# Patient Record
Sex: FEMALE | Race: BLACK OR AFRICAN AMERICAN | Employment: PART TIME | URBAN - METROPOLITAN AREA
[De-identification: names, ages, dates, MRNs, and addresses within clinical notes are randomized per-mention and may not be internally consistent; named-entity substitution may affect disease eponyms.]

---

## 2018-05-03 LAB
ANTIBODY SCREEN, EXTERNAL: NEGATIVE
HBSAG, EXTERNAL: NEGATIVE
HIV, EXTERNAL: NEGATIVE
PLATELET CNT,   EXTERNAL: NORMAL
RPR, EXTERNAL: NORMAL
RUBELLA, EXTERNAL: NORMAL
TYPE, ABO & RH, EXTERNAL: NORMAL

## 2018-08-10 LAB — GRBS, EXTERNAL: POSITIVE

## 2018-09-04 ENCOUNTER — HOSPITAL ENCOUNTER (INPATIENT)
Age: 33
LOS: 2 days | Discharge: HOME | DRG: 560 | End: 2018-09-06
Attending: OBSTETRICS & GYNECOLOGY | Admitting: OBSTETRICS & GYNECOLOGY
Payer: MEDICAID

## 2018-09-04 PROBLEM — Z33.1 IUP (INTRAUTERINE PREGNANCY), INCIDENTAL: Status: ACTIVE | Noted: 2018-09-04

## 2018-09-04 LAB
ABO + RH BLD: NORMAL
BASOPHILS # BLD: 0 K/UL (ref 0–0.1)
BASOPHILS NFR BLD: 0 % (ref 0–2)
BLOOD GROUP ANTIBODIES SERPL: NORMAL
DIFFERENTIAL METHOD BLD: ABNORMAL
EOSINOPHIL # BLD: 0.1 K/UL (ref 0–0.4)
EOSINOPHIL NFR BLD: 1 % (ref 0–5)
ERYTHROCYTE [DISTWIDTH] IN BLOOD BY AUTOMATED COUNT: 14.3 % (ref 11.6–14.5)
HCT VFR BLD AUTO: 36 % (ref 35–45)
HGB BLD-MCNC: 12.8 G/DL (ref 12–16)
LYMPHOCYTES # BLD: 1.9 K/UL (ref 0.9–3.6)
LYMPHOCYTES NFR BLD: 16 % (ref 21–52)
MCH RBC QN AUTO: 33.6 PG (ref 24–34)
MCHC RBC AUTO-ENTMCNC: 35.6 G/DL (ref 31–37)
MCV RBC AUTO: 94.5 FL (ref 74–97)
MONOCYTES # BLD: 0.7 K/UL (ref 0.05–1.2)
MONOCYTES NFR BLD: 6 % (ref 3–10)
NEUTS SEG # BLD: 8.6 K/UL (ref 1.8–8)
NEUTS SEG NFR BLD: 77 % (ref 40–73)
PLATELET # BLD AUTO: 248 K/UL (ref 135–420)
PMV BLD AUTO: 11.1 FL (ref 9.2–11.8)
RBC # BLD AUTO: 3.81 M/UL (ref 4.2–5.3)
SPECIMEN EXP DATE BLD: NORMAL
WBC # BLD AUTO: 11.3 K/UL (ref 4.6–13.2)

## 2018-09-04 PROCEDURE — 65270000029 HC RM PRIVATE

## 2018-09-04 PROCEDURE — 74011250636 HC RX REV CODE- 250/636: Performed by: OBSTETRICS & GYNECOLOGY

## 2018-09-04 PROCEDURE — 0HQ9XZZ REPAIR PERINEUM SKIN, EXTERNAL APPROACH: ICD-10-PCS | Performed by: OBSTETRICS & GYNECOLOGY

## 2018-09-04 PROCEDURE — 85025 COMPLETE CBC W/AUTO DIFF WBC: CPT | Performed by: OBSTETRICS & GYNECOLOGY

## 2018-09-04 PROCEDURE — 74011250637 HC RX REV CODE- 250/637: Performed by: OBSTETRICS & GYNECOLOGY

## 2018-09-04 PROCEDURE — 4A1H74Z MONITORING OF PRODUCTS OF CONCEPTION, CARDIAC ELECTRICAL ACTIVITY, VIA NATURAL OR ARTIFICIAL OPENING: ICD-10-PCS | Performed by: OBSTETRICS & GYNECOLOGY

## 2018-09-04 PROCEDURE — 75410000000 HC DELIVERY VAGINAL/SINGLE

## 2018-09-04 PROCEDURE — 75410000002 HC LABOR FEE PER 1 HR

## 2018-09-04 PROCEDURE — 86900 BLOOD TYPING SEROLOGIC ABO: CPT | Performed by: OBSTETRICS & GYNECOLOGY

## 2018-09-04 PROCEDURE — 74011000258 HC RX REV CODE- 258: Performed by: OBSTETRICS & GYNECOLOGY

## 2018-09-04 PROCEDURE — 75410000003 HC RECOV DEL/VAG/CSECN EA 0.5 HR

## 2018-09-04 RX ORDER — METHYLERGONOVINE MALEATE 0.2 MG/ML
0.2 INJECTION INTRAVENOUS AS NEEDED
Status: DISCONTINUED | OUTPATIENT
Start: 2018-09-04 | End: 2018-09-04 | Stop reason: HOSPADM

## 2018-09-04 RX ORDER — SODIUM CHLORIDE, SODIUM LACTATE, POTASSIUM CHLORIDE, CALCIUM CHLORIDE 600; 310; 30; 20 MG/100ML; MG/100ML; MG/100ML; MG/100ML
125 INJECTION, SOLUTION INTRAVENOUS CONTINUOUS
Status: DISCONTINUED | OUTPATIENT
Start: 2018-09-04 | End: 2018-09-04 | Stop reason: HOSPADM

## 2018-09-04 RX ORDER — MINERAL OIL
30 OIL (ML) ORAL AS NEEDED
Status: DISCONTINUED | OUTPATIENT
Start: 2018-09-04 | End: 2018-09-04 | Stop reason: HOSPADM

## 2018-09-04 RX ORDER — ZOLPIDEM TARTRATE 5 MG/1
5 TABLET ORAL
Status: DISCONTINUED | OUTPATIENT
Start: 2018-09-04 | End: 2018-09-06 | Stop reason: HOSPADM

## 2018-09-04 RX ORDER — AMOXICILLIN 250 MG
1 CAPSULE ORAL
Status: DISCONTINUED | OUTPATIENT
Start: 2018-09-04 | End: 2018-09-06 | Stop reason: HOSPADM

## 2018-09-04 RX ORDER — OXYTOCIN/RINGER'S LACTATE 20/1000 ML
500 PLASTIC BAG, INJECTION (ML) INTRAVENOUS ONCE
Status: DISCONTINUED | OUTPATIENT
Start: 2018-09-04 | End: 2018-09-04 | Stop reason: HOSPADM

## 2018-09-04 RX ORDER — IBUPROFEN 400 MG/1
800 TABLET ORAL
Status: DISCONTINUED | OUTPATIENT
Start: 2018-09-04 | End: 2018-09-06 | Stop reason: HOSPADM

## 2018-09-04 RX ORDER — ACETAMINOPHEN 325 MG/1
650 TABLET ORAL
Status: DISCONTINUED | OUTPATIENT
Start: 2018-09-04 | End: 2018-09-06 | Stop reason: HOSPADM

## 2018-09-04 RX ORDER — OXYCODONE AND ACETAMINOPHEN 5; 325 MG/1; MG/1
2 TABLET ORAL
Status: DISCONTINUED | OUTPATIENT
Start: 2018-09-04 | End: 2018-09-06 | Stop reason: HOSPADM

## 2018-09-04 RX ORDER — LIDOCAINE HYDROCHLORIDE 10 MG/ML
20 INJECTION, SOLUTION EPIDURAL; INFILTRATION; INTRACAUDAL; PERINEURAL AS NEEDED
Status: DISCONTINUED | OUTPATIENT
Start: 2018-09-04 | End: 2018-09-04 | Stop reason: HOSPADM

## 2018-09-04 RX ORDER — NALBUPHINE HYDROCHLORIDE 10 MG/ML
10 INJECTION, SOLUTION INTRAMUSCULAR; INTRAVENOUS; SUBCUTANEOUS
Status: DISCONTINUED | OUTPATIENT
Start: 2018-09-04 | End: 2018-09-04 | Stop reason: HOSPADM

## 2018-09-04 RX ORDER — OXYTOCIN/RINGER'S LACTATE 20/1000 ML
125 PLASTIC BAG, INJECTION (ML) INTRAVENOUS CONTINUOUS
Status: DISCONTINUED | OUTPATIENT
Start: 2018-09-04 | End: 2018-09-04 | Stop reason: HOSPADM

## 2018-09-04 RX ORDER — LIDOCAINE HYDROCHLORIDE 10 MG/ML
INJECTION INFILTRATION; PERINEURAL
Status: DISPENSED
Start: 2018-09-04 | End: 2018-09-05

## 2018-09-04 RX ORDER — TERBUTALINE SULFATE 1 MG/ML
0.25 INJECTION SUBCUTANEOUS
Status: DISCONTINUED | OUTPATIENT
Start: 2018-09-04 | End: 2018-09-04 | Stop reason: HOSPADM

## 2018-09-04 RX ORDER — PROMETHAZINE HYDROCHLORIDE 25 MG/ML
25 INJECTION, SOLUTION INTRAMUSCULAR; INTRAVENOUS
Status: DISCONTINUED | OUTPATIENT
Start: 2018-09-04 | End: 2018-09-06 | Stop reason: HOSPADM

## 2018-09-04 RX ORDER — BUTORPHANOL TARTRATE 1 MG/ML
2 INJECTION INTRAMUSCULAR; INTRAVENOUS
Status: DISCONTINUED | OUTPATIENT
Start: 2018-09-04 | End: 2018-09-04 | Stop reason: HOSPADM

## 2018-09-04 RX ORDER — HYDROMORPHONE HYDROCHLORIDE 2 MG/ML
1 INJECTION, SOLUTION INTRAMUSCULAR; INTRAVENOUS; SUBCUTANEOUS
Status: DISCONTINUED | OUTPATIENT
Start: 2018-09-04 | End: 2018-09-04 | Stop reason: HOSPADM

## 2018-09-04 RX ADMIN — Medication 125 ML/HR: at 12:29

## 2018-09-04 RX ADMIN — BUTORPHANOL TARTRATE 2 MG: 1 INJECTION, SOLUTION INTRAMUSCULAR; INTRAVENOUS at 08:23

## 2018-09-04 RX ADMIN — OXYCODONE HYDROCHLORIDE AND ACETAMINOPHEN 1 TABLET: 5; 325 TABLET ORAL at 20:54

## 2018-09-04 RX ADMIN — IBUPROFEN 800 MG: 400 TABLET ORAL at 16:01

## 2018-09-04 RX ADMIN — SODIUM CHLORIDE 5 MILLION UNITS: 900 INJECTION INTRAVENOUS at 08:30

## 2018-09-04 NOTE — PROGRESS NOTES
TRANSFER - IN REPORT: 
 
Verbal report received from Radha Mendieta RN (name) on Nehemiah Hallmark  being received from L and D (unit) for routine progression of care Report consisted of patients Situation, Background, Assessment and  
Recommendations(SBAR). Information from the following report(s) SBAR, Intake/Output, MAR and Recent Results was reviewed with the receiving nurse. Opportunity for questions and clarification was provided. Assessment completed upon patients arrival to unit and care assumed. VSS. Pt voided x 3 in L and D. Reviewed don-care, ice, tucks and dermaplast given. Oriented to room and unit. Denies needs. 1635-asleep in bed. 1800-asleep in bed. Advised needs to attempt to feed infant and order dinner. Denies needs. 1925-Bedside and Verbal shift change report given to DAMON Moroe RN  (oncoming nurse) by NEREYDA Alvarado LPN (offgoing nurse). Report given with SBAR, Kardex, Intake/Output, MAR and Recent Results.

## 2018-09-04 NOTE — LACTATION NOTE
Attempted at 1350 for about 35 minutes--infant able to latch off and on. Mom very tired and asking to take a break. Encouraged skin to skin and attempt again in 30 minutes. Mom educated on breastfeeding basics--hunger cues, feeding on demand, waking baby if baby sleeps too long between feeds, importance of skin to skin, positioning and latching, risk of pacifier use and supplemental feedings, and importance of rooming in--and use of log sheet. Mom also educated on benefits of breastfeeding for herself and baby. Mom verbalized understanding. No questions at this time.

## 2018-09-04 NOTE — IP AVS SNAPSHOT
Summary of Care Report The Summary of Care report has been created to help improve care coordination. Users with access to [employer] or [address] (Web-based application) may access additional patient information including the Discharge Summary. If you are not currently a [address] user and need more information, please call the number listed below in the [place] [address] section and ask to be connected with Medical Records. Facility Information Name Address Phone [address] [address] [phone] Patient Information Patient Name Sex DOB Charmain Lanes ([id number]) Female 1985 Discharge Information Admitting Provider Service Area Unit Mita Castillo MD / [address] [place] [address] / [phone] Discharge Provider Discharge Date/Time Discharge Disposition Destination (none) 2018 (Pending) AHR (none) Patient Language Language ENGLISH [13] Hospital Problems as of 2018  Never Reviewed Class Noted - Resolved Last Modified POA Active Problems IUP (intrauterine pregnancy), incidental  2018 - Present 2018 by Mita Castillo MD Unknown Entered by Mita Castillo MD  
  
You are allergic to the following No active allergies Current Discharge Medication List  
  
CONTINUE these medications which have NOT CHANGED Dose & Instructions Dispensing Information Comments PNV66-Iron Fumarate-FA-DSS-DHA 26-1.2- mg Cap Dose:  1 Tab Take 1 Tab by mouth daily. Refills:  0 Follow-up Information Follow up With Details Comments Contact Info None   None (395) Patient stated that they have no PCP Discharge Instructions POST DELIVERY DISCHARGE INSTRUCTIONS Name: Jim Claudio YOB: 1985 Primary Diagnosis: Active Problems: 
  IUP (intrauterine pregnancy), incidental (2018) General:  
 
Diet/Diet Restrictions: 
Eight 8-ounce glasses of fluid daily (water, juices); avoid excessive caffeine intake. Meals/snacks as desired which are high in fiber and carbohydrates and low in fat and cholesterol. Physical Activity / Restrictions / Safety:  
 
Avoid heavy lifting, no more that 8 lbs. For 2-3 weeks; limit use of stairs to 2 times daily for the first week home. No driving for one week. Avoid intercourse 4-6 weeks, no douching or tampon use. Check with obstetrician before starting or resuming an exercise program.    
 
 
Discharge Instructions/Special Treatment/Home Care Needs:  
 
Continue prenatal vitamins. Continue to use squirt bottle with warm water on your episiotomy after each bathroom use until bleeding stops. If steri-strips applied to your incision, remove in 7-10 days. Call your doctor for the following:  
 
Fever over 101 degrees by mouth. Vaginal bleeding heavier than a normal menstrual period or clot larger than a golf ball. Red streaks or increased swelling of legs, painful red streaks on your breast. 
Painful urination, constipation and increased pain or swelling or discharge with your incision. If you feel extremely anxious or overwhelmed. If you have thoughts of harming yourself and/or your baby. Pain Management:  
 
Pain Management:  
Take Acetaminophen (Tylenol) or Ibuprofen (Advil, Motrin), as directed for pain. Use a warm Sitz bath 3 times daily to relieve episiotomy or hemorrhoidal discomfort. Heating pad to  incision as needed. For hemorrhoidal discomfort, use Tucks and Anusol cream as needed and directed. Follow-Up Care: These are general instructions for a healthy lifestyle: No smoking/ No tobacco products/ Avoid exposure to second hand smoke Surgeon General's Warning:  Quitting smoking now greatly reduces serious risk to your health. Obesity, smoking, and sedentary lifestyle greatly increases your risk for illness A healthy diet, regular physical exercise & weight monitoring are important for maintaining a healthy lifestyle Recognize signs and symptoms of STROKE: 
 
F-face looks uneven A-arms unable to move or move unevenly S-speech slurred or non-existent T-time-call 911 as soon as signs and symptoms begin-DO NOT go Back to bed or wait to see if you get better-TIME IS BRAIN. Signed By: Jeremy Sesay                                                                                                   Date: 9/6/2018 Time: 11:41 AM 
 
Patient armband removed and given to patient to take home. Patient was informed of the privacy risks if armband lost or stolen Chart Review Routing History No Routing History on File

## 2018-09-04 NOTE — PROGRESS NOTES
0803  at 39 weeks ambultes to unit stating spontaneous rupture of membranes, pt states clear moderate amount of fluid at approx 0600; taken to tr1 
 
0658 Nitrazine postive, leaking clear fluid. Taken to Room 4 
 
0705 Bedside and Verbal shift change report given to SAUMYA Muñoz RN (oncoming nurse) by Missy Davalos RN (offgoing nurse). Report included the following information SBAR, Intake/Output, MAR and Recent Results.

## 2018-09-04 NOTE — PROGRESS NOTES
1427 Bedside and Verbal shift change report given to Celestino Osorio RN 
 (oncoming nurse) by Joya Mathis RN (offgoing nurse). Report included the following information SBAR, Kardex, Intake/Output, MAR, Recent Results and Med Rec Status. 7823 SVE 5/80/-1 by this nurse  
 
7859 paged Dr. Josep Layne 
 
1493 Return call from Dr. Josep Layne, informed of pt's status, SROM, cervical exam and + GBS status. Orders rec'd to admit patient for labor. 0820 Pt requested PRN Stadol, SVE unchanged. 9106 PRN Stadol given 1226 Spontaneous delivery of liveborn male infant. 1230 Spontaneous delivery of placenta. 1345 Pt up to BR with minimal assist. Voiding without difficulty. 1555 TRANSFER - OUT REPORT: 
 
Verbal report given to [facility] LPN(name) on "[employer] Michael  being transferred to post partum(unit) for routine progression of care Report consisted of patients Situation, Background, Assessment and  
Recommendations(SBAR). Information from the following report(s) SBAR, Kardex, Procedure Summary, Intake/Output, MAR, Recent Results and Med Rec Status was reviewed with the receiving nurse. Lines:  
Peripheral IV 09/04/18 Left Forearm (Active) Site Assessment Clean, dry, & intact 9/4/2018  3:55 PM  
Phlebitis Assessment 0 9/4/2018  3:55 PM  
Infiltration Assessment 0 9/4/2018  3:55 PM  
Dressing Status Clean, dry, & intact 9/4/2018  3:55 PM  
Dressing Type Transparent;Tape 9/4/2018  3:55 PM  
Hub Color/Line Status Pink;Capped 9/4/2018  3:55 PM  
Alcohol Cap Used Yes 9/4/2018  8:00 AM  
  
 
Opportunity for questions and clarification was provided. Patient transported with: 
 Registered Nurse

## 2018-09-04 NOTE — H&P
History & Physical 
 
Name: Luz Marina Verma MRN: [id number]  SSN: [id number] YOB: 1985  Age: 34 y.o. Sex: female Subjective:  
 
Estimated Date of Delivery: 18 OB History  Para Term  AB Living  
 1 SAB TAB Ectopic Molar Multiple Live Births Ms. Sacha Espitia is admitted with pregnancy at 39w4d for active labor. Prenatal course was normal.Prenatal care has been [facility] by [facility]. Please see prenatal records for details. No past medical history on file. No past surgical history on file. Social History Occupational History  Not on file. Social History Main Topics  Smoking status: Not on file  Smokeless tobacco: Not on file  Alcohol use Not on file  Drug use: Not on file  Sexual activity: Not on file No family history on file. No Known Allergies Prior to Admission medications Medication Sig Start Date End Date Taking? Authorizing Provider PNV66-Iron Fumarate-FA-DSS-DHA 26-1.2- mg cap Take 1 Tab by mouth daily. Yes Historical Provider Review of Systems: A comprehensive review of systems was negative except for that written in the HPI. Objective:  
 
Vitals: 
Vitals:  
 18 6490 BP: 119/69 Pulse: 69 Physical Exam: 
Cervical Exam: 500/-2 [place]: q 3 min FHTs: category 1 Prenatal Labs:  
Lab Results Component Value Date/Time  
 Rubella, External immune 2018 GrBStrep, External positive 08/10/2018 HBsAg, External negative 2018 HIV, External negative 2018 RPR, External non-reactive 2018 Assessment/Plan:  
 
Plan: Admit for labor with reassuring fetal status. Group B Strep was positive in urine at the beginning of pregnnacy, will treat prophylactically with PCN. Signed By:  Kearney Bosworth, MD   
 2018

## 2018-09-04 NOTE — IP AVS SNAPSHOT
[address] [place] [address] 
[phone] Patient: Ari Ambrocio MRN: [id number] :1985 About your hospitalization You were admitted on:  2018 You last received care in the:  [address] [place] [address] You were discharged on:  2018 Why you were hospitalized Your primary diagnosis was:  Not on File Your diagnoses also included:  Iup (Intrauterine Pregnancy), Incidental  
  
Follow-up Information Follow up With Details Comments Contact Info None   None (395) Patient stated that they have no PCP Discharge Orders None A check shaheed indicates which time of day the medication should be taken. My Medications CONTINUE taking these medications Instructions Each Dose to Equal  
 Morning Noon Evening Bedtime PNV66-Iron Fumarate-FA-DSS-DHA 26-1.2- mg Cap Your last dose was: Your next dose is: Take 1 Tab by mouth daily. 1 Tab Discharge Instructions POST DELIVERY DISCHARGE INSTRUCTIONS Name: Ari Ambrocio YOB: 1985 Primary Diagnosis: Active Problems: 
  IUP (intrauterine pregnancy), incidental (2018) General:  
 
Diet/Diet Restrictions: 
Eight 8-ounce glasses of fluid daily (water, juices); avoid excessive caffeine intake. Meals/snacks as desired which are high in fiber and carbohydrates and low in fat and cholesterol. Physical Activity / Restrictions / Safety:  
 
Avoid heavy lifting, no more that 8 lbs. For 2-3 weeks; limit use of stairs to 2 times daily for the first week home. No driving for one week. Avoid intercourse 4-6 weeks, no douching or tampon use. Check with obstetrician before starting or resuming an exercise program.    
 
 
Discharge Instructions/Special Treatment/Home Care Needs:  
 
Continue prenatal vitamins. Continue to use squirt bottle with warm water on your episiotomy after each bathroom use until bleeding stops. If steri-strips applied to your incision, remove in 7-10 days. Call your doctor for the following:  
 
Fever over 101 degrees by mouth. Vaginal bleeding heavier than a normal menstrual period or clot larger than a golf ball. Red streaks or increased swelling of legs, painful red streaks on your breast. 
Painful urination, constipation and increased pain or swelling or discharge with your incision. If you feel extremely anxious or overwhelmed. If you have thoughts of harming yourself and/or your baby. Pain Management:  
 
Pain Management:  
Take Acetaminophen (Tylenol) or Ibuprofen (Advil, Motrin), as directed for pain. Use a warm Sitz bath 3 times daily to relieve episiotomy or hemorrhoidal discomfort. Heating pad to  incision as needed. For hemorrhoidal discomfort, use Tucks and Anusol cream as needed and directed. Follow-Up Care: These are general instructions for a healthy lifestyle: No smoking/ No tobacco products/ Avoid exposure to second hand smoke Surgeon General's Warning:  Quitting smoking now greatly reduces serious risk to your health. Obesity, smoking, and sedentary lifestyle greatly increases your risk for illness A healthy diet, regular physical exercise & weight monitoring are important for maintaining a healthy lifestyle Recognize signs and symptoms of STROKE: 
 
F-face looks uneven A-arms unable to move or move unevenly S-speech slurred or non-existent T-time-call 911 as soon as signs and symptoms begin-DO NOT go Back to bed or wait to see if you get better-TIME IS BRAIN. Signed By: Lacie Dean                                                                                                   Date: 2018 Time: 11:41 AM 
 
Patient armband removed and given to patient to take home.   Patient was informed of the privacy risks if jeffry lost or stolen Introducing [facility]! [facility] introduces [employer] patient portal. Now you can access parts of your medical record, email your doctor's office, and request medication refills online. 1. In your internet browser, go to [email or web address] 2. Click on the First Time User? Click Here link in the Sign In box. You will see the New Member Sign Up page. 3. Enter your [employer] Access Code exactly as it appears below. You will not need to use this code after youve completed the sign-up process. If you do not sign up before the expiration date, you must request a new code. · [employer] Access Code: [facility] Expires: 11/7/2018  1:06 PM 
 
4. Enter the last four digits of your Social Security Number (xxxx) and Date of Birth (mm/dd/yyyy) as indicated and click Submit. You will be taken to the next sign-up page. 5. Create a [employer] ID. This will be your [employer] login ID and cannot be changed, so think of one that is secure and easy to remember. 6. Create a [employer] password. You can change your password at any time. 7. Enter your Password Reset Question and Answer. This can be used at a later time if you forget your password. 8. Enter your e-mail address. You will receive e-mail notification when new information is available in [address] 9. Click Sign Up. You can now view and download portions of your medical record. 10. Click the Download Summary menu link to download a portable copy of your medical information. If you have questions, please visit the Frequently Asked Questions section of the [employer] website. Remember, [employer] is NOT to be used for urgent needs. For medical emergencies, dial 911. Now available from your iPhone and Android! Introducing Thad Del Castillo As a [facility] patient, I wanted to make you aware of our electronic visit tool called Thad Del Castillo. [employer] allows you to connect within minutes with a medical provider 24 hours a day, seven days a week via a mobile device or tablet or logging into a secure website from your computer. You can access [employer] from anywhere in the [place]. A virtual visit might be right for you when you have a simple condition and feel like you just dont want to get out of bed, or cant get away from work for an appointment, when your regular [facility] provider is not available (evenings, weekends or holidays), or when youre out of town and need minor care. Electronic visits cost only $49 and if the [employer] provider determines a prescription is needed to treat your condition, one can be electronically transmitted to a nearby pharmacy*. Please take a moment to enroll today if you have not already done so. The enrollment process is free and takes just a few minutes. To enroll, please download the [employer] sawyer to your tablet or phone, or visit [email or web address] to enroll on your computer. And, as an [address] [place] [address] patient with a [employer] account, the results of your visits will be scanned into your electronic medical record and your primary care provider will be able to view the scanned results. We urge you to continue to see your regular [facility] provider for your ongoing medical care. And while your primary care provider may not be the one available when you seek a [employer] virtual visit, the peace of mind you get from getting a real diagnosis real time can be priceless. For more information on [employer] booknathalyfin, view our Frequently Asked Questions (FAQs) at [email or web address]. Sincerely, 
 
Edward Riddle MD 
Chief Medical Officer [facility] *:  certain medications cannot be prescribed via [employer] Providers Seen During Your Hospitalization Provider Specialty Primary office phone Princess Kelly MD Obstetrics & Gynecology [phone] Your Primary Care Physician (PCP) Primary Care Physician Office Phone Office Fax NONE ** None ** ** None ** You are allergic to the following No active allergies Recent Documentation Breastfeeding? OB Status Unknown Recent pregnancy Emergency Contacts Name Discharge Info Relation Home Work Mobile Dax Corbett DISCHARGE CAREGIVER [3] Mother [14]   [phone] Patient Belongings The following personal items are in your possession at time of discharge: 
  Dental Appliances: None         Home Medications: None   Jewelry: None  Clothing: Pants, Undergarments, With patient, Footwear, Shirt    Other Valuables: Cell Phone () Please provide this summary of care documentation to your next provider. Signatures-by signing, you are acknowledging that this After Visit Summary has been reviewed with you and you have received a copy. Patient Signature:  ____________________________________________________________ Date:  ____________________________________________________________  
  
Jaylan Pina Provider Signature:  ____________________________________________________________ Date:  ____________________________________________________________

## 2018-09-05 LAB
HCT VFR BLD AUTO: 30.4 % (ref 35–45)
HGB BLD-MCNC: 10.8 G/DL (ref 12–16)

## 2018-09-05 PROCEDURE — 65270000029 HC RM PRIVATE

## 2018-09-05 PROCEDURE — 74011250637 HC RX REV CODE- 250/637: Performed by: OBSTETRICS & GYNECOLOGY

## 2018-09-05 PROCEDURE — 36415 COLL VENOUS BLD VENIPUNCTURE: CPT | Performed by: OBSTETRICS & GYNECOLOGY

## 2018-09-05 PROCEDURE — 85014 HEMATOCRIT: CPT | Performed by: OBSTETRICS & GYNECOLOGY

## 2018-09-05 PROCEDURE — 85018 HEMOGLOBIN: CPT | Performed by: OBSTETRICS & GYNECOLOGY

## 2018-09-05 RX ADMIN — OXYCODONE HYDROCHLORIDE AND ACETAMINOPHEN 2 TABLET: 5; 325 TABLET ORAL at 19:54

## 2018-09-05 RX ADMIN — IBUPROFEN 800 MG: 400 TABLET ORAL at 07:38

## 2018-09-05 NOTE — LACTATION NOTE
Attempted with and without nipple shield, but infant only able to latch with nipple shield and nurse for about 5 minutes off and on. Set mom up with double electric breast pump and educated on how to use initiation mode, pump hygiene, and safe milk storage due to infant not nursing well. Mom to pump q 3 hours for 15 minutes on initiation mode after feeds. Mom verbalized understanding and no questions at this time.

## 2018-09-05 NOTE — PROGRESS NOTES
2015- Bedside report received from Miguel Thornton RN . Pt. Stable. Needs addressed. Callbell within reach. 2110- Pt. Joined at bedside by baby. AAOx4. VSS. Will continue to monitor. Pain 6/10. Educated on pain management. Pain medication administered as ordered. Educated on plan of care. No further questions on concerns at this time. Fundus firm at U-1, small rubra lochia. No clots noted. Assessment complete. Assisted with breastfeeding and education. Callbell within reach. Bed in lowest position. 1718- Bedside and Verbal shift change report given to NEREYDA Scott RN  (oncoming nurse) by STEFANIE Lee (offgoing nurse). Report included the following information SBAR, Kardex, Intake/Output, MAR and Recent Results.

## 2018-09-05 NOTE — PROGRESS NOTES
Progress Note Patient: Baylee Ricks MRN: [id number]  SSN: [id number] YOB: 1985  Age: 34 y.o. Sex: female Subjective:  
 
Postpartum Day: 1 Delivery: vaginal delivery The patient feels well. The patient denies emotional concerns. The baby iswell. Baby is feeding via breast.  The patient is ambulating well. The patient  tolerating a normal diet. Flatus has been passed. Objective:  
  
Patient Vitals for the past 8 hrs: 
 BP Temp Pulse Resp SpO2  
09/05/18 0728 107/58 98.7 °F (37.1 °C) 71 18 100 % LABS: Recent Results (from the past 24 hour(s)) HEMATOCRIT Collection Time: 09/05/18  3:42 AM  
Result Value Ref Range HCT 30.4 (L) 35.0 - 45.0 % HEMOGLOBIN Collection Time: 09/05/18  3:42 AM  
Result Value Ref Range HGB 10.8 (L) 12.0 - 16.0 g/dL Lochia:  appropriate Uterine Fundus:   firm Fundus Location:  -1 Incision:  no significant drainage DVT Evaluation:  No evidence of DVT seen on physical exam.  
 
Lab/Data Review: All lab results for the last 24 hours reviewed. Assessment:  
 
Status post: Doing well postpartum vaginal delivery Plan:  
 
Postpartum care discussed including diet, ambulation, and actvitiy restrictions. Discharge instructions and questions answered for vaginal delivery. Signed By: Katalina Cleveland MD   
 September 5, 2018

## 2018-09-05 NOTE — PROGRESS NOTES
Assumed care of pt. 
2105- pain med given. 0820-breast feeding. 1010-assessment completed. INT d/c'd. Denies needs. 1255-resting in bed. Baby to nsy for circ. 1340-baby out to room. circ instructions reviewed with pt. 
1530-asleep in bed. Infant in bassinet. 1645-advised to feed infant. 1805-awake in bed eating dinner. 1930-Bedside and Verbal shift change report given to NEREYDA Quiñones RN  (oncoming nurse) by NEREYDA Alvarado LPN (offgoing nurse). Report given with SBAR, Kardex, Intake/Output, MAR and Recent Results.

## 2018-09-06 VITALS
DIASTOLIC BLOOD PRESSURE: 73 MMHG | RESPIRATION RATE: 18 BRPM | TEMPERATURE: 99 F | OXYGEN SATURATION: 100 % | SYSTOLIC BLOOD PRESSURE: 112 MMHG | HEART RATE: 73 BPM

## 2018-09-06 PROCEDURE — 74011250637 HC RX REV CODE- 250/637: Performed by: OBSTETRICS & GYNECOLOGY

## 2018-09-06 RX ADMIN — IBUPROFEN 800 MG: 400 TABLET ORAL at 09:17

## 2018-09-06 NOTE — PROGRESS NOTES
Progress Note Patient: Cornelia Garcia MRN: [id number]  SSN: [id number] YOB: 1985  Age: 34 y.o. Sex: female ROOM:  [facility]/01 Subjective:  
 
Postpartum Day: 2 Delivery: vaginal delivery The patient feels well. The patient denies emotional concerns. The baby is well. Baby is feeding via breast.  The patient is ambulating well. The patient  tolerating a normal diet. Flatus has been passed. Objective:  
  
Patient Vitals for the past 24 hrs: 
 BP Temp Pulse Resp SpO2  
09/05/18 1950 108/55 98.4 °F (36.9 °C) 75 20 100 % 09/05/18 0728 107/58 98.7 °F (37.1 °C) 71 18 100 % Lochia:  appropriate Uterine Fundus:   firm Fundus Location:  -3 Incision: DVT Evaluation:  No evidence of DVT seen on physical exam. 
Negative Cristian's sign. No cords or calf tenderness. No significant calf/ankle edema. Lab/Data Review: All lab results for the last 24 hours reviewed. LABS: Recent Results (from the past 48 hour(s)) CBC WITH AUTOMATED DIFF Collection Time: 09/04/18  7:23 AM  
Result Value Ref Range WBC 11.3 4.6 - 13.2 K/uL  
 RBC 3.81 (L) 4.20 - 5.30 M/uL  
 HGB 12.8 12.0 - 16.0 g/dL HCT 36.0 35.0 - 45.0 % MCV 94.5 74.0 - 97.0 FL  
 MCH 33.6 24.0 - 34.0 PG  
 MCHC 35.6 31.0 - 37.0 g/dL  
 RDW 14.3 11.6 - 14.5 % PLATELET 136 880 - 264 K/uL MPV 11.1 9.2 - 11.8 FL  
 NEUTROPHILS 77 (H) 40 - 73 % LYMPHOCYTES 16 (L) 21 - 52 % MONOCYTES 6 3 - 10 % EOSINOPHILS 1 0 - 5 % BASOPHILS 0 0 - 2 %  
 ABS. NEUTROPHILS 8.6 (H) 1.8 - 8.0 K/UL  
 ABS. LYMPHOCYTES 1.9 0.9 - 3.6 K/UL  
 ABS. MONOCYTES 0.7 0.05 - 1.2 K/UL  
 ABS. EOSINOPHILS 0.1 0.0 - 0.4 K/UL  
 ABS. BASOPHILS 0.0 0.0 - 0.1 K/UL  
 DF AUTOMATED    
TYPE & SCREEN Collection Time: 09/04/18  7:23 AM  
Result Value Ref Range Crossmatch Expiration 09/07/2018 ABO/Rh(D) A POSITIVE Antibody screen NEG   
HEMATOCRIT Collection Time: 09/05/18  3:42 AM  
Result Value Ref Range HCT 30.4 (L) 35.0 - 45.0 % HEMOGLOBIN Collection Time: 09/05/18  3:42 AM  
Result Value Ref Range HGB 10.8 (L) 12.0 - 16.0 g/dL Assessment:  
 
Status post: Doing well postpartum vaginal delivery Plan:  
 
Postpartum care discussed including diet, ambulation, and actvitiy restrictions. Discharge instructions and questions answered. Signed By: Nora Magallon MD   
 September 6, 2018

## 2018-09-06 NOTE — DISCHARGE INSTRUCTIONS
POST DELIVERY DISCHARGE INSTRUCTIONS    Name: Juliana Hamilton  YOB: 1985  Primary Diagnosis: Active Problems:    IUP (intrauterine pregnancy), incidental (2018)        General:     Diet/Diet Restrictions:  Eight 8-ounce glasses of fluid daily (water, juices); avoid excessive caffeine intake. Meals/snacks as desired which are high in fiber and carbohydrates and low in fat and cholesterol. Physical Activity / Restrictions / Safety:     Avoid heavy lifting, no more that 8 lbs. For 2-3 weeks; limit use of stairs to 2 times daily for the first week home. No driving for one week. Avoid intercourse 4-6 weeks, no douching or tampon use. Check with obstetrician before starting or resuming an exercise program.         Discharge Instructions/Special Treatment/Home Care Needs:     Continue prenatal vitamins. Continue to use squirt bottle with warm water on your episiotomy after each bathroom use until bleeding stops. If steri-strips applied to your incision, remove in 7-10 days. Call your doctor for the following:     Fever over 101 degrees by mouth. Vaginal bleeding heavier than a normal menstrual period or clot larger than a golf ball. Red streaks or increased swelling of legs, painful red streaks on your breast.  Painful urination, constipation and increased pain or swelling or discharge with your incision. If you feel extremely anxious or overwhelmed. If you have thoughts of harming yourself and/or your baby. Pain Management:     Pain Management:   Take Acetaminophen (Tylenol) or Ibuprofen (Advil, Motrin), as directed for pain. Use a warm Sitz bath 3 times daily to relieve episiotomy or hemorrhoidal discomfort. Heating pad to  incision as needed. For hemorrhoidal discomfort, use Tucks and Anusol cream as needed and directed. Follow-Up Care:      These are general instructions for a healthy lifestyle:    No smoking/ No tobacco products/ Avoid exposure to second hand smoke    Surgeon General's Warning:  Quitting smoking now greatly reduces serious risk to your health. Obesity, smoking, and sedentary lifestyle greatly increases your risk for illness    A healthy diet, regular physical exercise & weight monitoring are important for maintaining a healthy lifestyle    Recognize signs and symptoms of STROKE:    F-face looks uneven    A-arms unable to move or move unevenly    S-speech slurred or non-existent    T-time-call 911 as soon as signs and symptoms begin-DO NOT go       Back to bed or wait to see if you get better-TIME IS BRAIN. Signed By: Rosanna Soto                                                                                                   Date: 9/6/2018 Time: 11:41 AM    Patient armband removed and given to patient to take home.   Patient was informed of the privacy risks if armband lost or stolen

## 2018-09-06 NOTE — LACTATION NOTE
Patient sleeping, will return 1120 Breastfeeding discharge teaching completed to include feeding on demand, foremilk and hindmilk importance, engorgement, mastitis, clogged ducts, pumping, breastmilk storage, and returning to work. Information given about breastfeeding support group and unit and office phone numbers provided and encouraged mom to reach out if concerns arise, but that [facility] would be calling her in the next few days to follow up on breastfeeding. Mom verbalized understanding and no questions at this time.

## 2018-09-06 NOTE — PROGRESS NOTES
Bedside and Verbal shift change report given to STEFANIE Gonzalez  (oncoming nurse) by NEREYDA Quiñones RN (offgoing nurse). Report included the following information SBAR, Kardex, Procedure Summary, Intake/Output, MAR, Accordion and Recent Results.

## 2018-09-06 NOTE — ROUTINE PROCESS
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

## 2018-09-06 NOTE — PROGRESS NOTES
Received handoff report from NEREYDA Quiñones RN via [employer]. Patient in stable condition. Identification bands verified and matched to mother's band. Currently resting in room. No further needs reported at this time. Will continue to monitor frequently. 1251 Patient discharged via wheelchair per protocol. Patient in stable condition. Discharged education reviewed and packet given to patient. Patient verbalizes understanding of discharge instructions. E-sign completed. Armbands removed and given to patient. No further needs or questions reported at this time.